# Patient Record
Sex: FEMALE | URBAN - METROPOLITAN AREA
[De-identification: names, ages, dates, MRNs, and addresses within clinical notes are randomized per-mention and may not be internally consistent; named-entity substitution may affect disease eponyms.]

---

## 2022-12-19 ENCOUNTER — LAB OUTSIDE AN ENCOUNTER (OUTPATIENT)
Dept: URBAN - METROPOLITAN AREA CLINIC 115 | Facility: CLINIC | Age: 67
End: 2022-12-19

## 2022-12-19 ENCOUNTER — DASHBOARD ENCOUNTERS (OUTPATIENT)
Age: 67
End: 2022-12-19

## 2022-12-19 ENCOUNTER — OFFICE VISIT (OUTPATIENT)
Dept: URBAN - METROPOLITAN AREA CLINIC 115 | Facility: CLINIC | Age: 67
End: 2022-12-19
Payer: MEDICARE

## 2022-12-19 ENCOUNTER — TELEPHONE ENCOUNTER (OUTPATIENT)
Dept: URBAN - METROPOLITAN AREA CLINIC 115 | Facility: CLINIC | Age: 67
End: 2022-12-19

## 2022-12-19 VITALS — BODY MASS INDEX: 18.62 KG/M2 | HEART RATE: 91 BPM | TEMPERATURE: 97.3 F | WEIGHT: 101.2 LBS | HEIGHT: 62 IN

## 2022-12-19 DIAGNOSIS — J02.9 SORE THROAT: ICD-10-CM

## 2022-12-19 DIAGNOSIS — R14.0 BLOATING: ICD-10-CM

## 2022-12-19 DIAGNOSIS — R42 VERTIGO: ICD-10-CM

## 2022-12-19 DIAGNOSIS — Z12.11 COLON CANCER SCREENING: ICD-10-CM

## 2022-12-19 PROCEDURE — 99205 OFFICE O/P NEW HI 60 MIN: CPT | Performed by: INTERNAL MEDICINE

## 2022-12-19 PROCEDURE — 91065 BREATH HYDROGEN/METHANE TEST: CPT | Performed by: INTERNAL MEDICINE

## 2022-12-19 RX ORDER — LANSOPRAZOLE 30 MG/1
1 CAPSULE BEFORE A MEAL CAPSULE, DELAYED RELEASE ORAL ONCE A DAY
Status: ACTIVE | COMMUNITY

## 2022-12-19 NOTE — HPI-TODAY'S VISIT:
66 y/o white female presents for GERD. Problems since early 40's.  Has had a series of problems.  The last was "buzzing in head" and "vertigo", states read "peer related journal" that said it's related to reflux. Put herself on a restricted diet, low acid foods, and that helped her. But tired of restricted.  Gets a fluttering if lays on side.  Has back issues.  Notes she had something seen on pancras during back MRI.  Abd MRI is scheduled 1/4/2023 in follow up.  Also has blaoting.  Ceertain foods give bloating (example peas).  Will take maalox etc for gas.  Normal bowel habits are maybe once per day, some days won't have a bowel movement.  Over the years takes Mylanta, GasX, Zantac, they made her nausea and didn't work. Discovered eventually that dairy was a trigger for extreme gas, stabbing pains, etc.  Even a small amount of dairy will trigger symtpoms.  will take up to 2 weeks for symptoms to resolve.  Sees Dr. Crump, ENT, for vertigo, in past was always in spring in the fall. Now other times. Convinced it's reflux related.  Last EGD was in her 40's, was told stomach was a little red.  ON repeated abx w amoxicillin and Z-pac.  She has never had screening colonoscopy.

## 2023-02-17 ENCOUNTER — LAB OUTSIDE AN ENCOUNTER (OUTPATIENT)
Dept: URBAN - METROPOLITAN AREA CLINIC 115 | Facility: CLINIC | Age: 68
End: 2023-02-17

## 2023-02-17 ENCOUNTER — TELEPHONE ENCOUNTER (OUTPATIENT)
Dept: URBAN - METROPOLITAN AREA CLINIC 115 | Facility: CLINIC | Age: 68
End: 2023-02-17

## 2023-02-27 PROBLEM — 162397003 SORE THROAT: Status: ACTIVE | Noted: 2023-02-27

## 2023-02-27 PROBLEM — 271832001 FLATULENCE, ERUCTATION AND GAS PAIN: Status: ACTIVE | Noted: 2023-02-27

## 2023-04-21 ENCOUNTER — OFFICE VISIT (OUTPATIENT)
Dept: URBAN - METROPOLITAN AREA SURGERY CENTER 13 | Facility: SURGERY CENTER | Age: 68
End: 2023-04-21

## 2023-08-08 ENCOUNTER — OFFICE VISIT (OUTPATIENT)
Dept: URBAN - METROPOLITAN AREA SURGERY CENTER 13 | Facility: SURGERY CENTER | Age: 68
End: 2023-08-08

## 2024-08-05 ENCOUNTER — LAB OUTSIDE AN ENCOUNTER (OUTPATIENT)
Dept: URBAN - METROPOLITAN AREA CLINIC 115 | Facility: CLINIC | Age: 69
End: 2024-08-05

## 2024-08-05 ENCOUNTER — OFFICE VISIT (OUTPATIENT)
Dept: URBAN - METROPOLITAN AREA CLINIC 115 | Facility: CLINIC | Age: 69
End: 2024-08-05
Payer: MEDICARE

## 2024-08-05 VITALS — WEIGHT: 102.2 LBS | BODY MASS INDEX: 18.81 KG/M2 | HEIGHT: 62 IN | TEMPERATURE: 98.9 F

## 2024-08-05 DIAGNOSIS — R14.0 BLOATING: ICD-10-CM

## 2024-08-05 DIAGNOSIS — K59.09 CHRONIC CONSTIPATION: ICD-10-CM

## 2024-08-05 DIAGNOSIS — J02.9 SORE THROAT: ICD-10-CM

## 2024-08-05 PROCEDURE — 99214 OFFICE O/P EST MOD 30 MIN: CPT | Performed by: INTERNAL MEDICINE

## 2024-08-05 RX ORDER — LANSOPRAZOLE 30 MG/1
1 CAPSULE BEFORE A MEAL CAPSULE, DELAYED RELEASE ORAL ONCE A DAY
Status: ACTIVE | COMMUNITY

## 2024-08-05 NOTE — HPI-TODAY'S VISIT:
68 y/o white female presents for GERD. Problems since early 40's.  Has had a series of problems.  The last was "buzzing in head" and "vertigo", states read "peer related journal" that said it's related to reflux. Put herself on a restricted diet, low acid foods, and that helped her. But tired of restricted.  Gets a fluttering if lays on side.  Has back issues.  Notes she had something seen on pancras during back MRI.  Abd MRI is scheduled 1/4/2023 in follow up.  Also has blaoting.  Ceertain foods give bloating (example peas).  Will take maalox etc for gas.  Normal bowel habits are maybe once per day, some days won't have a bowel movement.  Over the years takes Mylanta, GasX, Zantac, they made her nausea and didn't work. Discovered eventually that dairy was a trigger for extreme gas, stabbing pains, etc.  Even a small amount of dairy will trigger symtpoms.  will take up to 2 weeks for symptoms to resolve.  Sees Dr. Crump, ENT, for vertigo, in past was always in spring in the fall. Now other times. Convinced it's reflux related.  Last EGD was in her 40's, was told stomach was a little red.  ON repeated abx w amoxicillin and Z-pac.  She has never had screening colonoscopy.   8/5/24: Today she reports problems are getting worse. Complains of lymph nodes tightening up in neck and vertigo if eats acidic food.  She complains of bloating and discomfort.   Had COVID 2/2023, had diarrhea, only time didn't feel bloating.  If doesn't take Maalox will go 5 days between bowel movemwnts then will be hard stools.  Maalox will help with gas and help bowel movements.  DRiking water, eating or even taking meds will get bloated and distended. Miralax daily  Hasn't tried Metamucil. Beans  Drinks plenty of fluids.

## 2024-08-07 ENCOUNTER — TELEPHONE ENCOUNTER (OUTPATIENT)
Dept: URBAN - METROPOLITAN AREA CLINIC 115 | Facility: CLINIC | Age: 69
End: 2024-08-07

## 2024-08-14 ENCOUNTER — WEB ENCOUNTER (OUTPATIENT)
Dept: URBAN - METROPOLITAN AREA CLINIC 115 | Facility: CLINIC | Age: 69
End: 2024-08-14

## 2024-08-30 ENCOUNTER — TELEPHONE ENCOUNTER (OUTPATIENT)
Dept: URBAN - METROPOLITAN AREA CLINIC 115 | Facility: CLINIC | Age: 69
End: 2024-08-30

## 2024-09-15 ENCOUNTER — WEB ENCOUNTER (OUTPATIENT)
Dept: URBAN - METROPOLITAN AREA CLINIC 115 | Facility: CLINIC | Age: 69
End: 2024-09-15

## 2024-10-07 ENCOUNTER — OFFICE VISIT (OUTPATIENT)
Dept: URBAN - METROPOLITAN AREA CLINIC 115 | Facility: CLINIC | Age: 69
End: 2024-10-07